# Patient Record
Sex: FEMALE
[De-identification: names, ages, dates, MRNs, and addresses within clinical notes are randomized per-mention and may not be internally consistent; named-entity substitution may affect disease eponyms.]

---

## 2018-02-14 ENCOUNTER — RX ONLY (OUTPATIENT)
Age: 83
Setting detail: RX ONLY
End: 2018-02-14

## 2018-02-14 ENCOUNTER — APPOINTMENT (RX ONLY)
Dept: URBAN - METROPOLITAN AREA CLINIC 293 | Facility: CLINIC | Age: 83
Setting detail: DERMATOLOGY
End: 2018-02-14

## 2018-02-14 DIAGNOSIS — L738 OTHER SPECIFIED DISEASES OF HAIR AND HAIR FOLLICLES: ICD-10-CM

## 2018-02-14 DIAGNOSIS — L73.9 FOLLICULAR DISORDER, UNSPECIFIED: ICD-10-CM

## 2018-02-14 DIAGNOSIS — L663 OTHER SPECIFIED DISEASES OF HAIR AND HAIR FOLLICLES: ICD-10-CM

## 2018-02-14 PROBLEM — L02.02 FURUNCLE OF FACE: Status: ACTIVE | Noted: 2018-02-14

## 2018-02-14 PROCEDURE — ? TREATMENT REGIMEN

## 2018-02-14 PROCEDURE — ? PRESCRIPTION

## 2018-02-14 PROCEDURE — ? ADDITIONAL NOTES

## 2018-02-14 PROCEDURE — ? COUNSELING

## 2018-02-14 PROCEDURE — 99201: CPT

## 2018-02-14 RX ORDER — CLOBETASOL PROPIONATE 0.5 MG/ML
SOLUTION TOPICAL
Qty: 3 | Refills: 3 | COMMUNITY
Start: 2018-02-14

## 2018-02-14 RX ORDER — CLOBETASOL PROPIONATE 0.5 MG/ML
SOLUTION TOPICAL
Qty: 1 | Refills: 1 | Status: ERX

## 2018-02-14 RX ADMIN — CLOBETASOL PROPIONATE: 0.5 SOLUTION TOPICAL at 21:17

## 2018-02-14 ASSESSMENT — LOCATION SIMPLE DESCRIPTION DERM: LOCATION SIMPLE: RIGHT FOREHEAD

## 2018-02-14 ASSESSMENT — LOCATION DETAILED DESCRIPTION DERM: LOCATION DETAILED: RIGHT SUPERIOR MEDIAL FOREHEAD

## 2018-02-14 ASSESSMENT — LOCATION ZONE DERM: LOCATION ZONE: FACE

## 2018-02-14 NOTE — PROCEDURE: TREATMENT REGIMEN
Detail Level: Zone
Initiate Treatment: Clobetasol 0.05% solution twice a day for a week then one week off

## 2018-02-14 NOTE — PROCEDURE: ADDITIONAL NOTES
Additional Notes: Patient presents with scattered scabs all over her head.\\n\\nPatient presents with normal thining.\\n\\nPatient will start drinking 4-6 glasses of water everyday\\n\\nPatient will start washing her hair more often then once a week she will start washing her hair every other day.\\n\\nDiscussed with patient that she presents with a irritated folliculitis. \\n\\nPatient will return in 6-8 weeks for a follow up appointment. \\n\\nPatient will resume using Rossi shampoo and conditioner.\\n\\nPatient will start using Clobetasol 0.05% twice a day for two weeks then stop for one week.

## 2018-02-14 NOTE — PROCEDURE: MIPS QUALITY
Quality 111:Pneumonia Vaccination Status For Older Adults: Pneumococcal Vaccination not Administered or Previously Received, Reason not Otherwise Specified
Quality 110: Preventive Care And Screening: Influenza Immunization: Influenza Immunization Administered during Influenza season
Quality 431: Preventive Care And Screening: Unhealthy Alcohol Use - Screening: Patient screened for unhealthy alcohol use using a single question and scores less than 2 times per year
Quality 226: Preventive Care And Screening: Tobacco Use: Screening And Cessation Intervention: Patient screened for tobacco and never smoked
Quality 130: Documentation Of Current Medications In The Medical Record: Current Medications Documented
Detail Level: Detailed

## 2018-03-28 ENCOUNTER — APPOINTMENT (RX ONLY)
Dept: URBAN - METROPOLITAN AREA CLINIC 293 | Facility: CLINIC | Age: 83
Setting detail: DERMATOLOGY
End: 2018-03-28

## 2018-03-28 DIAGNOSIS — L738 OTHER SPECIFIED DISEASES OF HAIR AND HAIR FOLLICLES: ICD-10-CM

## 2018-03-28 DIAGNOSIS — L663 OTHER SPECIFIED DISEASES OF HAIR AND HAIR FOLLICLES: ICD-10-CM

## 2018-03-28 DIAGNOSIS — L73.9 FOLLICULAR DISORDER, UNSPECIFIED: ICD-10-CM

## 2018-03-28 PROBLEM — F41.9 ANXIETY DISORDER, UNSPECIFIED: Status: ACTIVE | Noted: 2018-03-28

## 2018-03-28 PROBLEM — M12.9 ARTHROPATHY, UNSPECIFIED: Status: ACTIVE | Noted: 2018-03-28

## 2018-03-28 PROBLEM — E03.9 HYPOTHYROIDISM, UNSPECIFIED: Status: ACTIVE | Noted: 2018-03-28

## 2018-03-28 PROBLEM — I48.91 UNSPECIFIED ATRIAL FIBRILLATION: Status: ACTIVE | Noted: 2018-03-28

## 2018-03-28 PROBLEM — E78.5 HYPERLIPIDEMIA, UNSPECIFIED: Status: ACTIVE | Noted: 2018-03-28

## 2018-03-28 PROBLEM — L02.02 FURUNCLE OF FACE: Status: ACTIVE | Noted: 2018-03-28

## 2018-03-28 PROBLEM — L29.8 OTHER PRURITUS: Status: ACTIVE | Noted: 2018-03-28

## 2018-03-28 PROCEDURE — 99212 OFFICE O/P EST SF 10 MIN: CPT

## 2018-03-28 PROCEDURE — ? TREATMENT REGIMEN

## 2018-03-28 PROCEDURE — ? PRESCRIPTION

## 2018-03-28 PROCEDURE — ? ADDITIONAL NOTES

## 2018-03-28 PROCEDURE — ? COUNSELING

## 2018-03-28 RX ORDER — DOXYCYCLINE 100 MG/1
CAPSULE ORAL
Qty: 60 | Refills: 3 | Status: ERX | COMMUNITY
Start: 2018-03-28

## 2018-03-28 RX ADMIN — DOXYCYCLINE: 100 CAPSULE ORAL at 17:09

## 2018-03-28 ASSESSMENT — LOCATION DETAILED DESCRIPTION DERM: LOCATION DETAILED: RIGHT SUPERIOR MEDIAL FOREHEAD

## 2018-03-28 ASSESSMENT — LOCATION SIMPLE DESCRIPTION DERM: LOCATION SIMPLE: RIGHT FOREHEAD

## 2018-03-28 ASSESSMENT — LOCATION ZONE DERM: LOCATION ZONE: FACE

## 2018-03-28 NOTE — PROCEDURE: MIPS QUALITY
Quality 226: Preventive Care And Screening: Tobacco Use: Screening And Cessation Intervention: Patient screened for tobacco and never smoked
Quality 110: Preventive Care And Screening: Influenza Immunization: Influenza Immunization Administered during Influenza season
Quality 130: Documentation Of Current Medications In The Medical Record: Current Medications Documented
Quality 111:Pneumonia Vaccination Status For Older Adults: Pneumococcal Vaccination not Administered or Previously Received, Reason not Otherwise Specified
Detail Level: Detailed
Quality 431: Preventive Care And Screening: Unhealthy Alcohol Use - Screening: Patient screened for unhealthy alcohol use using a single question and scores less than 2 times per year

## 2018-03-28 NOTE — PROCEDURE: ADDITIONAL NOTES
Additional Notes: Patient presents with less lesions on scalps.\\n\\nPatient presents with less inflammatory lesions on scalps. \\n\\nPatient will continue using Rossi shampoo and conditioner.\\n\\nPatient will start taking doxycycline 100 mg twice a day. \\n\\nPatient will follow up in 4-6 weeks an if not better we will do a bx.

## 2018-03-28 NOTE — PROCEDURE: TREATMENT REGIMEN
Detail Level: Zone
Continue Regimen: Clobetasol scalp solution as needed and start taking doxycycline twice a day